# Patient Record
Sex: FEMALE | Race: WHITE
[De-identification: names, ages, dates, MRNs, and addresses within clinical notes are randomized per-mention and may not be internally consistent; named-entity substitution may affect disease eponyms.]

---

## 2017-01-07 ENCOUNTER — HOSPITAL ENCOUNTER (EMERGENCY)
Dept: HOSPITAL 59 - ER | Age: 52
Discharge: HOME | End: 2017-01-07
Payer: COMMERCIAL

## 2017-01-07 DIAGNOSIS — R51: ICD-10-CM

## 2017-01-07 DIAGNOSIS — R05: ICD-10-CM

## 2017-01-07 DIAGNOSIS — J06.9: Primary | ICD-10-CM

## 2017-01-07 PROCEDURE — 99282 EMERGENCY DEPT VISIT SF MDM: CPT

## 2017-01-07 NOTE — EMERGENCY DEPARTMENT RECORD
History of Present Illness





- General


Chief complaint: ENT


Stated complaint: SINUS CONGESTION


Time Seen by Provider: 17 08:22


Source: Patient


Mode of Arrival: Ambulatory


Limitations: No limitations





- History of Present Illness


Initial comments: 


The patient is here due to a one day hx of nasal congestion, drainage and 

pressure in the sinus area. She also has had a nonproductive cough. There is no 

fever, chills, HA, SOB or ST. 





MD complaint: Other


Onset/Timin


-: Days(s)


Consistency: Getting worse


Improves with: None


Worsens with: None


Associated Symptoms: Cough, Other





- Related Data


 Home Medications











 Medication  Instructions  Recorded  Confirmed  Last Taken


 


Ascorbic Acid [Vitamin C] 500 mg PO DAILY PRN 02/24/15 01/07/17 Unknown


 


Cromolyn Sodium [Nasal Allergy 13 ml NS ASDIR PRN 02/24/15 01/07/17 Unknown





Spray]    


 


Diphenhydram/PE/Dm/Acetamin/ ml PO ASDIR PRN 02/24/15 01/07/17 Unknown





[Mucinex Fast-Max Day-Nite Cold]    


 


Fexofenadine/Pseudoephedrine 1 each PO ASDIR PRN 02/24/15 01/07/17 Unknown





[Allegra-D 12 Hour Tablet]    








 Previous Rx's











 Medication  Instructions  Recorded


 


Doxycycline Monohydrate [Mondoxyne 100 mg PO BID #20 capsule 17





Nl]  


 


Prednisone [Prednisone 20Mg] 40 mg PO DAILY #10 tab 17











 Allergies











Allergy/AdvReac Type Severity Reaction Status Date / Time


 


No Known Drug Allergies Allergy   Verified 17 08:16














Travel Screening





- Travel/Exposure Within Last 30 Days


Have you traveled within the last 30 days?: No





- Travel/Exposure Within Last Year


Have you traveled outside the [place] in the last year?: No





- Additonal Travel Details


Have you been exposed to anyone with a communicable illness?: No





- Travel Symptoms


Symptom Screening: None





Review of Systems


Constitutional: Denies: Chills, Fever


Eyes: Denies: Eye discharge


ENT: Reports: Congestion.  Denies: Ear pain


Respiratory: Reports: Cough.  Denies: Dyspnea


Cardiovascular: Denies: Arrhythmia





Past Medical History





- SOCIAL HISTORY


Smoking Status: Never smoker


Alcohol Use: Occassional


Drug Use: None





- RESPIRATORY


Hx Respiratory Disorders: No





- CARDIOVASCULAR


Hx Cardio Disorders: No





- NEURO


Hx Neuro Disorders: No





- GI


Hx GI Disorders: No





- 


Hx Genitourinary Disorders: No





- ENDOCRINE


Hx Endocrine Disorders: No





- MUSCULOSKELETAL


Hx Musculoskeletal Disorders: No





- PSYCH


Hx Psych Problems: No





- HEMATOLOGY/ONCOLOGY


Hx Hematology/Oncology Disorders: No





Family Medical History


Any Significant Family History?: No





Physical Exam





- General


General Appearance: Alert, Oriented x3, Cooperative, No acute distress





- Head


Head exam: Atraumatic, Normocephalic, Normal inspection





- Eye


Eye exam: Normal appearance, PERRL





- ENT


ENT exam: Normal exam, Mucous membranes moist, Normal external ear exam, Normal 

orophraynx, TM's normal bilaterally


Nasal Exam: negative: Discharge, Sinus tenderness


Throat exam: Normal inspection.  negative: Tonsillar erythema, Tonsillar exudate





- Neck


Neck exam: Normal inspection, Full ROM.  negative: Lymphadenopathy, Meningismus

, Tenderness





- Respiratory


Respiratory exam: Normal lung sounds bilaterally.  negative: Respiratory 

distress





- Cardiovascular


Cardiovascular Exam: Regular rate, Normal rhythm, Normal heart sounds





- Extremities


Extremities exam: Normal inspection, Full ROM, Normal capillary refill.  

negative: Tenderness





Course





 Vital Signs











  17





  08:15


 


Temperature 97.6 F


 


Pulse Rate 101 H


 


Respiratory 16





Rate 


 


Blood Pressure 146/92


 


Pulse Ox 98














- Reevaluation(s)


Reevaluation #1: 


I did explain to the patient that she most likely has a viral URI causing her 

problems. She is to continue her home medicines and add the Prednisone for 

congestion. She is to take the Doxycycline if not better in 4-5 days or sooner 

if getting worse.





17 08:36








Disposition


Disposition: Discharge


Clinical Impression: 


 Upper respiratory infection, acute


Disposition: Home, Self-Care


Condition: (1) Good


Instructions:  Upper Respiratory Infection (ED)


Additional Instructions: 


Please continue the Flonase and Allegra-D. Take the Prednisone as directed. 

Please start the Doxy if not better in 4-5 days. Return to the ER if worse.


Prescriptions: 


Doxycycline Monohydrate [Mondoxyne Nl] 100 mg PO BID #20 capsule


Prednisone [Prednisone 20Mg] 40 mg PO DAILY #10 tab


Forms:  Patient Portal Access


Time of Disposition: 08:33

## 2020-02-01 ENCOUNTER — HOSPITAL ENCOUNTER (EMERGENCY)
Dept: HOSPITAL 59 - ER | Age: 55
Discharge: HOME | End: 2020-02-01
Payer: COMMERCIAL

## 2020-02-01 DIAGNOSIS — K52.9: Primary | ICD-10-CM

## 2020-02-01 DIAGNOSIS — R10.84: ICD-10-CM

## 2020-02-01 LAB
ABSOLUTE NEUTROPHIL COUNT: 3.61
ALBUMIN SERPL-MCNC: 3.8 G/DL (ref 4–5)
ALBUMIN/GLOB SERPL: 1.3 {RATIO} (ref 1.1–1.8)
ALP SERPL-CCNC: 52 U/L (ref 35–104)
ALT SERPL-CCNC: 11 U/L (ref ?–33)
ANION GAP SERPL CALC-SCNC: 14 MMOL/L (ref 7–16)
AST SERPL-CCNC: 12 U/L (ref 10–35)
BASOPHILS NFR BLD: 0.5 % (ref 0–6)
BILIRUB SERPL-MCNC: 0.3 MG/DL (ref 0.2–1)
BUN SERPL-MCNC: 11 MG/DL (ref 6–20)
CO2 SERPL-SCNC: 22 MMOL/L (ref 22–29)
CREAT SERPL-MCNC: 0.5 MG/DL (ref 0.5–0.9)
EOSINOPHIL NFR BLD: 4.2 % (ref 0–6)
ERYTHROCYTE [DISTWIDTH] IN BLOOD BY AUTOMATED COUNT: 12.4 % (ref 11.5–14.5)
EST GLOMERULAR FILTRATION RATE: > 60 ML/MIN
GLOBULIN SER-MCNC: 3 GM/DL (ref 1.4–4.8)
GLUCOSE SERPL-MCNC: 137 MG/DL (ref 74–109)
GRANULOCYTES NFR BLD: 63.2 % (ref 47–80)
HCT VFR BLD CALC: 39.3 % (ref 35–47)
HGB BLD-MCNC: 12.9 GM/DL (ref 11.6–16)
LIPASE SERPL-CCNC: 19 U/L (ref 13–60)
LYMPHOCYTES NFR BLD AUTO: 20 % (ref 16–45)
MCH RBC QN AUTO: 30.6 PG (ref 27–33)
MCHC RBC AUTO-ENTMCNC: 32.8 G/DL (ref 32–36)
MCV RBC AUTO: 93.1 FL (ref 81–97)
MONOCYTES NFR BLD: 12.1 % (ref 0–9)
PLATELET # BLD: 232 K/UL (ref 130–400)
PMV BLD AUTO: 9.1 FL (ref 7.4–10.4)
PROT SERPL-MCNC: 6.8 G/DL (ref 6.6–8.7)
RBC # BLD AUTO: 4.22 M/UL (ref 3.8–5.4)
WBC # BLD AUTO: 5.7 K/UL (ref 4.2–12.2)

## 2020-02-01 PROCEDURE — 83690 ASSAY OF LIPASE: CPT

## 2020-02-01 PROCEDURE — 99284 EMERGENCY DEPT VISIT MOD MDM: CPT

## 2020-02-01 PROCEDURE — 85025 COMPLETE CBC W/AUTO DIFF WBC: CPT

## 2020-02-01 PROCEDURE — 74176 CT ABD & PELVIS W/O CONTRAST: CPT

## 2020-02-01 PROCEDURE — 80053 COMPREHEN METABOLIC PANEL: CPT

## 2020-02-01 NOTE — EMERGENCY DEPARTMENT RECORD
History of Present Illness





- General


Chief Complaint: Abdominal Pain


Stated Complaint: ABDOMINAL PAIN


Time Seen by Provider: 20 02:11


Source: Patient


Mode of Arrival: Ambulatory


Limitations: No limitations





- History of Present Illness


Initial Comments: 


The patient is here due to a 4 day hx of diffuse abdominal pain with diarrhea. 

The symptoms started 4 days ago and the diarrhea was very severe for 2 days. The

patient was seen on day # 3 and had labs and stool studies performed. Her lab 

work was WNL's and her stool was neg for any bacterial infections but pos for 

WBC's. Yesterday she did take Immodium and since has had no diarrhea. Now for 

the last 24 hours the abdominal cramping seems to be worsening but she has had 

no fever, nausea, or vomiting. Additionally the patient has been drinking fluids

well and eating small amounts without difficulty or vomiting.





MD Complaint: Abdominal pain


Onset/Timin


-: Days(s)


Location: Diffuse





- Related Data


                                Home Medications











 Medication  Instructions  Recorded  Confirmed  Last Taken


 


Estrogens, Conjugated [Premarin] 1 gm VG DAILY 20 Unknown








                                  Previous Rx's











 Medication  Instructions  Recorded


 


Azithromycin [Zithromax] 500 mg PO DAILY #6 tablet 20











                                    Allergies











Allergy/AdvReac Type Severity Reaction Status Date / Time


 


No Known Drug Allergies Allergy   Verified 20 02:13














Review of Systems


Constitutional: Denies: Chills, Fever


Eyes: Denies: Eye discharge


ENT: Denies: Congestion


Respiratory: Denies: Cough, Dyspnea





Past Medical History





- SOCIAL HISTORY


Smoking Status: Never smoker


Drug Use: None





- RESPIRATORY


Hx Respiratory Disorders: No





- CARDIOVASCULAR


Hx Cardio Disorders: No





- NEURO


Hx Neuro Disorders: No





- GI


Hx GI Disorders: No





- 


Hx Genitourinary Disorders: No





- ENDOCRINE


Hx Endocrine Disorders: No





- MUSCULOSKELETAL


Hx Musculoskeletal Disorders: No





- PSYCH


Hx Psych Problems: No





- HEMATOLOGY/ONCOLOGY


Hx Hematology/Oncology Disorders: No





Physical Exam





- General


General Appearance: Alert, Oriented x3, Cooperative, No acute distress





- Head


Head exam: Atraumatic, Normocephalic, Normal inspection





- Eye


Eye exam: Normal appearance





- Neck


Neck exam: Normal inspection, Full ROM.  negative: Tenderness





- Respiratory


Respiratory exam: Normal lung sounds bilaterally.  negative: Respiratory 

distress





- Cardiovascular


Cardiovascular Exam: Regular rate, Normal rhythm, Normal heart sounds





- GI/Abdominal


GI/Abdominal exam: Soft, Normal bowel sounds, Tenderness (There is mild diffuse 

tenderness in all 4 quads.).  negative: Diminished bowel sounds, Distended, 

Guarding, Hernia, Rebound, Rigid





- Extremities


Extremities exam: Normal inspection, Full ROM, Normal capillary refill.  

negative: Tenderness





- Neurological


Neurological exam: Alert.  negative: Motor sensory deficit





Course





                                   Vital Signs











  20





  02:14


 


Temperature 99.1 F


 


Pulse Rate [ 103 H





Pulse Ox Probe] 


 


Respiratory 20





Rate 


 


Blood Pressure 128/87





[Left Arm] 


 


Pulse Ox 96














- Reevaluation(s)


Reevaluation #1: 


The patient is doing very well at this time with a repeat temp of 98.1.. She has

had no vomiting or diarrhea here in the ER. On exam her abdomen is soft with 

very mild tenderness in mainly the lower abdomen. I did discuss the findings of 

the colitis with her and will emperically treat her for infections diarrhea due 

to the WBC's in the stool. She is to F/U with GI later this week and will need 

to return to the ER for any worsening symptoms.


20 03:16














Medical Decision Making





- Data Complexity


MDM Data: Labs Ordered and/or Reviewed, X-Ray Ordered and/or Reviewed





- Lab Data


Result diagrams: 


                                 20 02:34





                                 20 02:34





- Radiology Data


Radiology results: Report reviewed (CT: Mild colitis, O/W neg.)





Disposition


Disposition: Discharge


Clinical Impression: 


 Colitis





Disposition: Home, Self-Care


Condition: (2) Stable


Instructions:  Colitis (ED)


Additional Instructions: 


Please drink plenty of fluids and use Tylenol for pain. Please continue the 

Zithromax and do not take any Immodium. Please see one of the GI doctors later 

this week in the [facility]. Return to the ER for any worsening symptoms, 

pain, fever, or vomiting.


Prescriptions: 


Azithromycin [Zithromax] 500 mg PO DAILY #6 tablet


Referrals: 


[facility] [Provider Group]


Forms:  Patient Portal Access


Time of Disposition: 03:20





Quality





- Quality Measures


Quality Measures: N/A





- Blood Pressure Screening


View Details: Yes


Does Patient Have Any of the Following: No


Blood Pressure Classification: Pre-Hypertensive BP Reading


Systolic Measurement: 128


Diastolic Measurement: 88


Screening for High Blood Pressure: < Pre-Hypertensive BP, F/U Documented > 

[]


Pre-Hypertensive Follow-up Interventions: Referral to alternative/primary care 

provider.

## 2020-02-01 NOTE — CT SCAN REPORT
EXAMINATION: CT Abdomen and Pelvis without IV Contrast

EXAM DATE: 2/1/2020 2:53 AM



TECHNIQUE: Standard protocol CT imaging of the abdomen and pelvis was performed without intravenous c
ontrast. 



INDICATION: abdominal cramping

COMPARISON: None



ENCOUNTER: Not applicable

____________________



CT ABDOMEN AND PELVIS FINDINGS:    



Lung Bases: Included extent of the lung bases are clear.  



Hepatobiliary: The liver has a normal size with a smooth surface.  There is no biliary dilatation and
 the gallbladder is unremarkable.



Pancreas: The pancreas is normal.



Spleen: The spleen is not enlarged.



Adrenals: The adrenal glands are normal.



Kidneys, Ureters, & Bladder: Both kidneys have a normal size and morphology.  There is no hydronephro
sis. No renal calculi are present. Both ureters have a normal course and caliber and the urinary blad
margaret a normal morphology and uniform wall thickness. No ureteral or bladder calculi are identified.



Gastrointestinal: The stomach and small bowel are normal with no obstruction or inflammation. The kendra
endix is normal. The large bowel demonstrates diffuse mild wall thickening and associated inflammator
y changes compatible with colitis. 



Reproductive Organs: Unremarkable



Lymphatic System: There is no adenopathy within the abdomen or pelvis.



Vasculature: Normal caliber abdominal aorta    



Peritoneum: No free fluid, free air, or inflammation 



Abdominal wall & Musculoskeletal: There is multilevel lower lumbar degenerative disc disease.



Assessment of the solid organs, soft tissues, and vascular structures is overall limited on noncontra
st imaging,

____________________



IMPRESSION:



1.  Findings compatible with pan colitis.

2.  Additional findings, as above.





Dictated by: Saadia Gilbert MD on 2/1/2020 2:54 AM.

Electronically signed by: Saadia Gilbert MD on 2/1/2020 3:01 AM.